# Patient Record
Sex: FEMALE | Race: WHITE
[De-identification: names, ages, dates, MRNs, and addresses within clinical notes are randomized per-mention and may not be internally consistent; named-entity substitution may affect disease eponyms.]

---

## 2020-04-04 ENCOUNTER — HOSPITAL ENCOUNTER (EMERGENCY)
Dept: HOSPITAL 61 - ER | Age: 40
Discharge: HOME | End: 2020-04-04
Payer: MEDICAID

## 2020-04-04 VITALS — BODY MASS INDEX: 28.91 KG/M2 | HEIGHT: 63 IN | WEIGHT: 163.14 LBS

## 2020-04-04 VITALS — SYSTOLIC BLOOD PRESSURE: 119 MMHG | DIASTOLIC BLOOD PRESSURE: 55 MMHG

## 2020-04-04 DIAGNOSIS — F20.9: ICD-10-CM

## 2020-04-04 DIAGNOSIS — Z20.2: Primary | ICD-10-CM

## 2020-04-04 DIAGNOSIS — N39.0: ICD-10-CM

## 2020-04-04 DIAGNOSIS — F31.9: ICD-10-CM

## 2020-04-04 DIAGNOSIS — Z88.2: ICD-10-CM

## 2020-04-04 DIAGNOSIS — F17.200: ICD-10-CM

## 2020-04-04 LAB
ACETAMIN: < 2 MCG/ML (ref 10–30)
ALBUMIN SERPL-MCNC: 3.7 G/DL (ref 3.4–5)
ALBUMIN/GLOB SERPL: 1.1 {RATIO} (ref 1–1.7)
ALP SERPL-CCNC: 122 U/L (ref 46–116)
ALT SERPL-CCNC: 43 U/L (ref 14–59)
AMPHETAMINE/METHAMPHETAMINE: (no result)
ANION GAP SERPL CALC-SCNC: 7 MMOL/L (ref 6–14)
APTT PPP: YELLOW S
AST SERPL-CCNC: 69 U/L (ref 15–37)
BACTERIA #/AREA URNS HPF: 0 /HPF
BARBITURATES UR-MCNC: (no result) UG/ML
BASOPHILS # BLD AUTO: 0.1 X10^3/UL (ref 0–0.2)
BASOPHILS NFR BLD: 1 % (ref 0–3)
BENZODIAZ UR-MCNC: (no result) UG/L
BILIRUB SERPL-MCNC: 0.4 MG/DL (ref 0.2–1)
BILIRUB UR QL STRIP: NEGATIVE
BUN SERPL-MCNC: 21 MG/DL (ref 7–20)
BUN/CREAT SERPL: 23 (ref 6–20)
CALCIUM SERPL-MCNC: 9 MG/DL (ref 8.5–10.1)
CANNABINOIDS UR-MCNC: (no result) UG/L
CHLORIDE SERPL-SCNC: 100 MMOL/L (ref 98–107)
CO2 SERPL-SCNC: 28 MMOL/L (ref 21–32)
COCAINE UR-MCNC: (no result) NG/ML
CREAT SERPL-MCNC: 0.9 MG/DL (ref 0.6–1)
EOSINOPHIL NFR BLD: 0.2 X10^3/UL (ref 0–0.7)
EOSINOPHIL NFR BLD: 1 % (ref 0–3)
ERYTHROCYTE [DISTWIDTH] IN BLOOD BY AUTOMATED COUNT: 13.9 % (ref 11.5–14.5)
ETHANOL SERPL-MCNC: < 10 MG/DL (ref 0–10)
FIBRINOGEN PPP-MCNC: (no result) MG/DL
GFR SERPLBLD BASED ON 1.73 SQ M-ARVRAT: 69 ML/MIN
GLOBULIN SER-MCNC: 3.5 G/DL (ref 2.2–3.8)
GLUCOSE SERPL-MCNC: 91 MG/DL (ref 70–99)
HCT VFR BLD CALC: 37.6 % (ref 36–47)
HGB BLD-MCNC: 12.7 G/DL (ref 12–15.5)
LYMPHOCYTES # BLD: 2.2 X10^3/UL (ref 1–4.8)
LYMPHOCYTES NFR BLD AUTO: 15 % (ref 24–48)
MCH RBC QN AUTO: 29 PG (ref 25–35)
MCHC RBC AUTO-ENTMCNC: 34 G/DL (ref 31–37)
MCV RBC AUTO: 85 FL (ref 79–100)
METHADONE SERPL-MCNC: (no result) NG/ML
MONO #: 1.5 X10^3/UL (ref 0–1.1)
MONOCYTES NFR BLD: 10 % (ref 0–9)
NEUT #: 10.6 X10^3/UL (ref 1.8–7.7)
NEUTROPHILS NFR BLD AUTO: 73 % (ref 31–73)
NITRITE UR QL STRIP: NEGATIVE
OPIATES UR-MCNC: (no result) NG/ML
PCP SERPL-MCNC: (no result) MG/DL
PH UR STRIP: 5.5 [PH]
PLATELET # BLD AUTO: 360 X10^3/UL (ref 140–400)
POTASSIUM SERPL-SCNC: 3.5 MMOL/L (ref 3.5–5.1)
PROT SERPL-MCNC: 7.2 G/DL (ref 6.4–8.2)
PROT UR STRIP-MCNC: NEGATIVE MG/DL
RBC # BLD AUTO: 4.41 X10^6/UL (ref 3.5–5.4)
RBC #/AREA URNS HPF: 0 /HPF (ref 0–2)
SALIC: < 2.8 MG/DL (ref 2.8–20)
SODIUM SERPL-SCNC: 135 MMOL/L (ref 136–145)
SQUAMOUS #/AREA URNS LPF: (no result) /LPF
UROBILINOGEN UR-MCNC: 0.2 MG/DL
WBC # BLD AUTO: 14.6 X10^3/UL (ref 4–11)
WBC #/AREA URNS HPF: (no result) /HPF (ref 0–4)

## 2020-04-04 PROCEDURE — 87491 CHLMYD TRACH DNA AMP PROBE: CPT

## 2020-04-04 PROCEDURE — 81025 URINE PREGNANCY TEST: CPT

## 2020-04-04 PROCEDURE — G0480 DRUG TEST DEF 1-7 CLASSES: HCPCS

## 2020-04-04 PROCEDURE — 36415 COLL VENOUS BLD VENIPUNCTURE: CPT

## 2020-04-04 PROCEDURE — 96372 THER/PROPH/DIAG INJ SC/IM: CPT

## 2020-04-04 PROCEDURE — 87591 N.GONORRHOEAE DNA AMP PROB: CPT

## 2020-04-04 PROCEDURE — 80329 ANALGESICS NON-OPIOID 1 OR 2: CPT

## 2020-04-04 PROCEDURE — 85025 COMPLETE CBC W/AUTO DIFF WBC: CPT

## 2020-04-04 PROCEDURE — 80053 COMPREHEN METABOLIC PANEL: CPT

## 2020-04-04 PROCEDURE — 81001 URINALYSIS AUTO W/SCOPE: CPT

## 2020-04-04 PROCEDURE — 99284 EMERGENCY DEPT VISIT MOD MDM: CPT

## 2020-04-04 PROCEDURE — 80307 DRUG TEST PRSMV CHEM ANLYZR: CPT

## 2020-04-04 NOTE — PHYS DOC
Past Medical History


Past Medical History:  Anxiety, Bipolar, Depression, Schizophrenia


Past Surgical History:  Tonsillectomy


Smoking Status:  Current Every Day Smoker


Alcohol Use:  Occasionally





Adult General


Chief Complaint


Chief Complaint:  PSYCH EVALUATION





HPI


HPI





Patient is a 39  year old female with a history of bipolar, schizophrenia, 

anxiety and depression, who presents to the ED today complaining of running out 

of her psych medicines long time ago and needing psych evaluation as well as 

refills.  Patient denies any suicidal or homicidal ideations.  She is also 

requesting STD treatments, she states she has been living on the streets and has

been "touched" by men and would like to be tested and treated for STDs.  Denies 

any STD symptoms.





Review of Systems


Review of Systems





Constitutional: Denies fever or chills []


Eyes: Denies change in visual acuity, redness, or eye pain []


HENT: Denies nasal congestion or sore throat []


Respiratory: Denies cough or shortness of breath []


Cardiovascular: No additional information not addressed in HPI []


GI: Denies abdominal pain, nausea, vomiting, bloody stools or diarrhea []


: Request STD treatment.  Denies dysuria or hematuria []


Musculoskeletal: Denies back pain or joint pain []


Integument: Denies rash or skin lesions []


Neurologic: Denies headache, focal weakness or sensory changes []


Psych: Request for psychiatric evaluation





All other systems were reviewed and found to be within normal limits, except as 

documented in this note.





Current Medications


Current Medications





Current Medications








 Medications


  (Trade)  Dose


 Ordered  Sig/Terrell  Start Time


 Stop Time Status Last Admin


Dose Admin


 


 Azithromycin


  (Zithromax)  1,000 mg  1X  ONCE  4/4/20 18:30


 4/4/20 18:31 DC 4/4/20 18:33


1,000 MG


 


 Ceftriaxone Sodium


  (Rocephin Im)  250 mg  1X  ONCE  4/4/20 18:30


 4/4/20 18:31 DC 4/4/20 18:34


250 MG


 


 Lorazepam


  (Ativan)  0.5 mg  1X  ONCE  4/4/20 18:30


 4/4/20 18:31 DC 4/4/20 18:33


0.5 MG


 


 Metronidazole


  (Flagyl)  2,000 mg  1X  ONCE  4/4/20 18:30


 4/4/20 18:31 DC 4/4/20 18:33


2,000 MG











Allergies


Allergies





Allergies








Coded Allergies Type Severity Reaction Last Updated Verified


 


  Sulfa (Sulfonamide Antibiotics) Allergy Unknown Rash 4/4/20 Yes











Physical Exam


Physical Exam





Constitutional: Well developed, well nourished, no acute distress, non-toxic 

appearance. []


HENT: Normocephalic, atraumatic, bilateral external ears normal, oropharynx 

moist, no oral exudates, nose normal. []


Eyes: PERRLA, EOMI, conjunctiva normal, no discharge. [] 


Neck: Normal range of motion, no tenderness, supple, no stridor. [] 


Cardiovascular:Heart rate regular rhythm, no murmur []


Lungs & Thorax:  Bilateral breath sounds clear to auscultation []


Abdomen: Bowel sounds normal, soft, no tenderness, no masses, no pulsatile 

masses. [] 


Skin: Warm, dry, no erythema, no rash. [] 


Back: No tenderness, no CVA tenderness. [] 


Extremities: No tenderness, no cyanosis, no clubbing, ROM intact, no edema. [] 


Neurologic: Alert and oriented X 3, normal motor function, normal sensory 

function, no focal deficits noted. []


Psychologic: Flat affect, appears anxious





Current Patient Data


Vital Signs





                                   Vital Signs








  Date Time  Temp Pulse Resp B/P (MAP) Pulse Ox O2 Delivery O2 Flow Rate FiO2


 


4/4/20 18:00 98.0 107 16 119/55 (76) 98 Room Air  





 98.0       








Lab Values





                                Laboratory Tests








Test


 4/4/20


18:00 4/4/20


18:29 4/4/20


18:30


 


Urine Collection Type Unknown    


 


Urine Color Yellow    


 


Urine Clarity Cloudy    


 


Urine pH


 5.5 (<5.0-8.0)


 


 





 


Urine Specific Gravity


 1.020


(1.000-1.030) 


 





 


Urine Protein


 Negative mg/dL


(NEG-TRACE) 


 





 


Urine Glucose (UA)


 Negative mg/dL


(NEG) 


 





 


Urine Ketones (Stick)


 Trace mg/dL


(NEG) 


 





 


Urine Blood


 Negative (NEG)


 


 





 


Urine Nitrite


 Negative (NEG)


 


 





 


Urine Bilirubin


 Negative (NEG)


 


 





 


Urine Urobilinogen Dipstick


 0.2 mg/dL (0.2


mg/dL) 


 





 


Urine Leukocyte Esterase Small (NEG)    


 


Urine RBC 0 /HPF (0-2)    


 


Urine WBC


 1-4 /HPF (0-4)


 


 





 


Urine Squamous Epithelial


Cells Many /LPF  


 


 





 


Urine Bacteria


 0 /HPF (0-FEW)


 


 





 


Urine Mucus Slight /LPF    


 


Urine Opiates Screen Neg (NEG)    


 


Urine Methadone Screen Neg (NEG)    


 


Urine Barbiturates Neg (NEG)    


 


Urine Phencyclidine Screen Neg (NEG)    


 


Urine


Amphetamine/Methamphetamine Pos (NEG)  


 


 





 


Urine Benzodiazepines Screen Neg (NEG)    


 


Urine Cocaine Screen Neg (NEG)    


 


Urine Cannabinoids Screen Neg (NEG)    


 


Urine Ethyl Alcohol Neg (NEG)    


 


POC Urine HCG, Qualitative


 


 Hcg negative


(Negative) 





 


White Blood Count


 


 


 14.6 x10^3/uL


(4.0-11.0)  H


 


Red Blood Count


 


 


 4.41 x10^6/uL


(3.50-5.40)


 


Hemoglobin


 


 


 12.7 g/dL


(12.0-15.5)


 


Hematocrit


 


 


 37.6 %


(36.0-47.0)


 


Mean Corpuscular Volume


 


 


 85 fL ()





 


Mean Corpuscular Hemoglobin   29 pg (25-35)  


 


Mean Corpuscular Hemoglobin


Concent 


 


 34 g/dL


(31-37)


 


Red Cell Distribution Width


 


 


 13.9 %


(11.5-14.5)


 


Platelet Count


 


 


 360 x10^3/uL


(140-400)


 


Neutrophils (%) (Auto)   73 % (31-73)  


 


Lymphocytes (%) (Auto)   15 % (24-48)  L


 


Monocytes (%) (Auto)   10 % (0-9)  H


 


Eosinophils (%) (Auto)   1 % (0-3)  


 


Basophils (%) (Auto)   1 % (0-3)  


 


Neutrophils # (Auto)


 


 


 10.6 x10^3/uL


(1.8-7.7)  H


 


Lymphocytes # (Auto)


 


 


 2.2 x10^3/uL


(1.0-4.8)


 


Monocytes # (Auto)


 


 


 1.5 x10^3/uL


(0.0-1.1)  H


 


Eosinophils # (Auto)


 


 


 0.2 x10^3/uL


(0.0-0.7)


 


Basophils # (Auto)


 


 


 0.1 x10^3/uL


(0.0-0.2)


 


Sodium Level


 


 


 135 mmol/L


(136-145)  L


 


Potassium Level


 


 


 3.5 mmol/L


(3.5-5.1)


 


Chloride Level


 


 


 100 mmol/L


()


 


Carbon Dioxide Level


 


 


 28 mmol/L


(21-32)


 


Anion Gap   7 (6-14)  


 


Blood Urea Nitrogen


 


 


 21 mg/dL


(7-20)  H


 


Creatinine


 


 


 0.9 mg/dL


(0.6-1.0)


 


Estimated GFR


(Cockcroft-Gault) 


 


 69.0  





 


BUN/Creatinine Ratio   23 (6-20)  H


 


Glucose Level


 


 


 91 mg/dL


(70-99)


 


Calcium Level


 


 


 9.0 mg/dL


(8.5-10.1)


 


Total Bilirubin


 


 


 0.4 mg/dL


(0.2-1.0)


 


Aspartate Amino Transferase


(AST) 


 


 69 U/L (15-37)


H


 


Alanine Aminotransferase (ALT)


 


 


 43 U/L (14-59)





 


Alkaline Phosphatase


 


 


 122 U/L


()  H


 


Total Protein


 


 


 7.2 g/dL


(6.4-8.2)


 


Albumin


 


 


 3.7 g/dL


(3.4-5.0)


 


Albumin/Globulin Ratio   1.1 (1.0-1.7)  


 


Salicylates Level


 


 


 < 2.8 mg/dL


(2.8-20.0)  L


 


Salicylate Last Dose Date   Unk  


 


Salicylate Last Dose Time   Unk  


 


Acetaminophen Level


 


 


 < 2.0 mcg/ml


(10-30)  L


 


Acetaminophen Last Dose Date   Unk  


 


Acetaminophen Last Dose Time   Unk  


 


Ethyl Alcohol Level


 


 


 < 10 mg/dL


(0-10)





                                Laboratory Tests


4/4/20 18:30








                                Laboratory Tests


4/4/20 18:30











EKG


EKG


[]





Radiology/Procedures


Radiology/Procedures


[]





Course & Med Decision Making


Course & Med Decision Making


Pertinent Labs and Imaging studies reviewed. (See chart for details)





This is a 39-year-old female patient presenting to the ED today requesting a 

psychiatric evaluation, she will also rainout of her anxiety and depression 

medications.  She is also requesting STD testing and treatment.  Urine was sent 

to lab





Labs are negative for any acute findings, urine analysis noted for small amount 

of leukocytes, patient was treated prophylaxis for STDs.





Gogo from the PAT team will come and evaluate patient





Gogo in the ED, evaluated patient, patient will head to [facility]





Joshua Disclaimer


Dragon Disclaimer


This electronic medical record was generated, in whole or in part, using a voice

 recognition dictation system.





Departure


Departure


Impression:  


   Primary Impression:  


   Concern about STD in female without diagnosis


   Additional Impressions:  


   Evaluation by psychiatric service required


   UTI (urinary tract infection)


Disposition:  01 HOME, SELF-CARE


Condition:  STABLE


Patient Instructions:  Urinary Tract Infection





Additional Instructions:  


Please go to [facility]





Problem Qualifiers








   Additional Impressions:  


   UTI (urinary tract infection)


   Urinary tract infection type:  site unspecified  Hematuria presence:  without

    hematuria  Qualified Codes:  N39.0 - Urinary tract infection, site not 

   specified








ALBAN BUTT APRN               Apr 4, 2020 18:39